# Patient Record
(demographics unavailable — no encounter records)

---

## 2019-06-19 NOTE — NUR
MD NOTIFIED THAT PT MEETS SEPSIS CRITERIA. 2L NS BOLUS COMPLETED. BC X 2 
COMPLETED. PT RECEIVING ABX.

## 2019-06-19 NOTE — NUR
PT ARRIVED TO ROOM 2 VIA GURNEY FROM [facility] FOR N/V/D FOR APPROX. 1 WEEK. 
FRIENDS/FAMILY ATTEMPTED TO TREAT WITH HOME REMEDIES. PT AAO X 4, REQUESTING 
WATER. PT'S MUCOUS MEMBRANES VERY DRY, MD AWARE. VSS, HR TACHY, NEW ORDERS 
RECEIVED.